# Patient Record
Sex: FEMALE | Race: BLACK OR AFRICAN AMERICAN
[De-identification: names, ages, dates, MRNs, and addresses within clinical notes are randomized per-mention and may not be internally consistent; named-entity substitution may affect disease eponyms.]

---

## 2020-08-14 ENCOUNTER — HOSPITAL ENCOUNTER (EMERGENCY)
Dept: HOSPITAL 15 - ER | Age: 25
Discharge: HOME | End: 2020-08-14
Payer: COMMERCIAL

## 2020-08-14 VITALS — WEIGHT: 200 LBS | HEIGHT: 69 IN | BODY MASS INDEX: 29.62 KG/M2

## 2020-08-14 VITALS — DIASTOLIC BLOOD PRESSURE: 61 MMHG | SYSTOLIC BLOOD PRESSURE: 107 MMHG

## 2020-08-14 DIAGNOSIS — N39.0: ICD-10-CM

## 2020-08-14 DIAGNOSIS — L51.9: Primary | ICD-10-CM

## 2020-08-14 LAB
ANION GAP SERPL CALCULATED.3IONS-SCNC: 6 MMOL/L (ref 5–15)
BUN SERPL-MCNC: 13 MG/DL (ref 7–18)
BUN/CREAT SERPL: 12.5
CALCIUM SERPL-MCNC: 8.5 MG/DL (ref 8.5–10.1)
CHLORIDE SERPL-SCNC: 103 MMOL/L (ref 98–107)
CO2 SERPL-SCNC: 27 MMOL/L (ref 21–32)
GLUCOSE SERPL-MCNC: 96 MG/DL (ref 74–106)
HCT VFR BLD AUTO: 40.5 % (ref 36–46)
HGB BLD-MCNC: 13.2 G/DL (ref 12.2–16.2)
MCH RBC QN AUTO: 27.9 PG (ref 28–32)
MCV RBC AUTO: 85.5 FL (ref 80–100)
NRBC BLD QL AUTO: 0.1 %
POTASSIUM SERPL-SCNC: 3.6 MMOL/L (ref 3.5–5.1)
SODIUM SERPL-SCNC: 136 MMOL/L (ref 136–145)

## 2020-08-14 PROCEDURE — 80048 BASIC METABOLIC PNL TOTAL CA: CPT

## 2020-08-14 PROCEDURE — 85025 COMPLETE CBC W/AUTO DIFF WBC: CPT

## 2020-08-14 PROCEDURE — 81001 URINALYSIS AUTO W/SCOPE: CPT

## 2020-08-14 PROCEDURE — 81025 URINE PREGNANCY TEST: CPT

## 2020-08-14 PROCEDURE — 36415 COLL VENOUS BLD VENIPUNCTURE: CPT

## 2021-04-29 ENCOUNTER — HOSPITAL ENCOUNTER (EMERGENCY)
Dept: HOSPITAL 15 - ER | Age: 26
LOS: 1 days | Discharge: HOME | End: 2021-04-30
Payer: SELF-PAY

## 2021-04-29 VITALS — HEIGHT: 69 IN | WEIGHT: 165 LBS | BODY MASS INDEX: 24.44 KG/M2

## 2021-04-29 DIAGNOSIS — T78.2XXA: Primary | ICD-10-CM

## 2021-04-29 PROCEDURE — 96374 THER/PROPH/DIAG INJ IV PUSH: CPT

## 2021-04-29 PROCEDURE — 99283 EMERGENCY DEPT VISIT LOW MDM: CPT

## 2021-04-29 PROCEDURE — 96361 HYDRATE IV INFUSION ADD-ON: CPT

## 2021-04-30 VITALS — SYSTOLIC BLOOD PRESSURE: 98 MMHG | DIASTOLIC BLOOD PRESSURE: 68 MMHG

## 2022-06-08 ENCOUNTER — HOSPITAL ENCOUNTER (OUTPATIENT)
Dept: HOSPITAL 15 - LAB | Age: 27
Discharge: HOME | End: 2022-06-08
Attending: OBSTETRICS & GYNECOLOGY
Payer: MEDICAID

## 2022-06-08 DIAGNOSIS — Z31.430: Primary | ICD-10-CM

## 2022-06-08 LAB
ALCOHOL, URINE: < 3 MG/DL (ref 0–10)
AMPHETAMINES UR QL SCN: NEGATIVE
BARBITURATES UR QL SCN: NEGATIVE
BENZODIAZ UR QL SCN: NEGATIVE
BZE UR QL SCN: NEGATIVE
CANNABINOIDS UR QL SCN: NEGATIVE
HCT VFR BLD AUTO: 39 % (ref 36–46)
HGB BLD-MCNC: 13.1 G/DL (ref 12.2–16.2)
MCH RBC QN AUTO: 29 PG (ref 28–32)
MCV RBC AUTO: 86.3 FL (ref 80–100)
NRBC BLD QL AUTO: 0.1 %
OPIATES UR QL SCN: NEGATIVE
PCP UR QL SCN: NEGATIVE

## 2022-06-08 PROCEDURE — 87340 HEPATITIS B SURFACE AG IA: CPT

## 2022-06-08 PROCEDURE — 84144 ASSAY OF PROGESTERONE: CPT

## 2022-06-08 PROCEDURE — 85660 RBC SICKLE CELL TEST: CPT

## 2022-06-08 PROCEDURE — 84112 EVAL AMNIOTIC FLUID PROTEIN: CPT

## 2022-06-08 PROCEDURE — 86900 BLOOD TYPING SEROLOGIC ABO: CPT

## 2022-06-08 PROCEDURE — 86703 HIV-1/HIV-2 1 RESULT ANTBDY: CPT

## 2022-06-08 PROCEDURE — 83021 HEMOGLOBIN CHROMOTOGRAPHY: CPT

## 2022-06-08 PROCEDURE — 86762 RUBELLA ANTIBODY: CPT

## 2022-06-08 PROCEDURE — 86901 BLOOD TYPING SEROLOGIC RH(D): CPT

## 2022-06-08 PROCEDURE — 87086 URINE CULTURE/COLONY COUNT: CPT

## 2022-06-08 PROCEDURE — 36415 COLL VENOUS BLD VENIPUNCTURE: CPT

## 2022-06-08 PROCEDURE — 85025 COMPLETE CBC W/AUTO DIFF WBC: CPT

## 2022-06-08 PROCEDURE — 86592 SYPHILIS TEST NON-TREP QUAL: CPT

## 2022-06-08 PROCEDURE — 84702 CHORIONIC GONADOTROPIN TEST: CPT

## 2022-06-08 PROCEDURE — 86850 RBC ANTIBODY SCREEN: CPT

## 2022-06-08 PROCEDURE — 80307 DRUG TEST PRSMV CHEM ANLYZR: CPT

## 2022-06-08 PROCEDURE — 83036 HEMOGLOBIN GLYCOSYLATED A1C: CPT

## 2022-07-03 ENCOUNTER — HOSPITAL ENCOUNTER (EMERGENCY)
Dept: HOSPITAL 15 - ER | Age: 27
Discharge: HOME | End: 2022-07-03
Payer: MEDICAID

## 2022-07-03 VITALS
HEIGHT: 69 IN | SYSTOLIC BLOOD PRESSURE: 118 MMHG | WEIGHT: 183 LBS | BODY MASS INDEX: 27.11 KG/M2 | DIASTOLIC BLOOD PRESSURE: 74 MMHG

## 2022-07-03 DIAGNOSIS — Z3A.20: ICD-10-CM

## 2022-07-03 DIAGNOSIS — O23.42: Primary | ICD-10-CM

## 2022-07-03 DIAGNOSIS — R51.9: ICD-10-CM

## 2022-07-03 DIAGNOSIS — N39.0: ICD-10-CM

## 2022-07-03 PROCEDURE — 81025 URINE PREGNANCY TEST: CPT

## 2022-07-03 PROCEDURE — 81001 URINALYSIS AUTO W/SCOPE: CPT

## 2022-07-14 ENCOUNTER — HOSPITAL ENCOUNTER (EMERGENCY)
Dept: HOSPITAL 15 - ER | Age: 27
Discharge: HOME | End: 2022-07-14
Payer: MEDICAID

## 2022-07-14 VITALS — DIASTOLIC BLOOD PRESSURE: 72 MMHG | SYSTOLIC BLOOD PRESSURE: 107 MMHG

## 2022-07-14 VITALS — WEIGHT: 185 LBS | HEIGHT: 69 IN | BODY MASS INDEX: 27.4 KG/M2

## 2022-07-14 DIAGNOSIS — N76.0: ICD-10-CM

## 2022-07-14 DIAGNOSIS — N39.0: Primary | ICD-10-CM

## 2022-07-14 PROCEDURE — 87210 SMEAR WET MOUNT SALINE/INK: CPT

## 2022-07-14 PROCEDURE — 81025 URINE PREGNANCY TEST: CPT

## 2022-07-14 PROCEDURE — 81001 URINALYSIS AUTO W/SCOPE: CPT

## 2022-09-27 ENCOUNTER — HOSPITAL ENCOUNTER (OUTPATIENT)
Dept: HOSPITAL 15 - LAB | Age: 27
Discharge: HOME | End: 2022-09-27
Attending: OBSTETRICS & GYNECOLOGY
Payer: MEDICAID

## 2022-09-27 DIAGNOSIS — Z3A.00: ICD-10-CM

## 2022-09-27 DIAGNOSIS — O99.810: Primary | ICD-10-CM

## 2022-09-27 LAB
HCT VFR BLD AUTO: 35.8 % (ref 36–46)
HGB BLD-MCNC: 11.8 G/DL (ref 12.2–16.2)
MCH RBC QN AUTO: 28.4 PG (ref 28–32)
MCV RBC AUTO: 86.1 FL (ref 80–100)
NRBC BLD QL AUTO: 0 %

## 2022-09-27 PROCEDURE — 85025 COMPLETE CBC W/AUTO DIFF WBC: CPT

## 2022-09-27 PROCEDURE — 36415 COLL VENOUS BLD VENIPUNCTURE: CPT

## 2022-09-27 PROCEDURE — 83036 HEMOGLOBIN GLYCOSYLATED A1C: CPT

## 2022-09-27 PROCEDURE — 82951 GLUCOSE TOLERANCE TEST (GTT): CPT

## 2022-10-25 ENCOUNTER — HOSPITAL ENCOUNTER (OUTPATIENT)
Dept: HOSPITAL 15 - LAB | Age: 27
Discharge: HOME | End: 2022-10-25
Attending: OBSTETRICS & GYNECOLOGY
Payer: MEDICAID

## 2022-10-25 DIAGNOSIS — Z20.2: Primary | ICD-10-CM

## 2022-10-25 PROCEDURE — 86592 SYPHILIS TEST NON-TREP QUAL: CPT

## 2022-10-25 PROCEDURE — 86703 HIV-1/HIV-2 1 RESULT ANTBDY: CPT

## 2022-10-25 PROCEDURE — 86696 HERPES SIMPLEX TYPE 2 TEST: CPT

## 2022-10-25 PROCEDURE — 86695 HERPES SIMPLEX TYPE 1 TEST: CPT

## 2022-10-25 PROCEDURE — 84112 EVAL AMNIOTIC FLUID PROTEIN: CPT

## 2022-10-28 ENCOUNTER — HOSPITAL ENCOUNTER (OUTPATIENT)
Dept: HOSPITAL 15 - LDRP | Age: 27
Setting detail: OBSERVATION
Discharge: HOME | End: 2022-10-28
Attending: OBSTETRICS & GYNECOLOGY | Admitting: OBSTETRICS & GYNECOLOGY
Payer: MEDICAID

## 2022-10-28 VITALS — BODY MASS INDEX: 27.4 KG/M2 | WEIGHT: 185 LBS | HEIGHT: 69 IN

## 2022-10-28 DIAGNOSIS — O60.03: Primary | ICD-10-CM

## 2022-10-28 DIAGNOSIS — J02.9: ICD-10-CM

## 2022-10-28 DIAGNOSIS — Z3A.31: ICD-10-CM

## 2022-10-28 DIAGNOSIS — O26.893: ICD-10-CM

## 2022-10-28 PROCEDURE — 36415 COLL VENOUS BLD VENIPUNCTURE: CPT

## 2022-10-28 PROCEDURE — 96360 HYDRATION IV INFUSION INIT: CPT

## 2022-10-28 PROCEDURE — 94760 N-INVAS EAR/PLS OXIMETRY 1: CPT

## 2022-10-28 PROCEDURE — 81002 URINALYSIS NONAUTO W/O SCOPE: CPT

## 2022-10-28 PROCEDURE — 96372 THER/PROPH/DIAG INJ SC/IM: CPT

## 2022-10-28 PROCEDURE — 59025 FETAL NON-STRESS TEST: CPT

## 2022-10-28 PROCEDURE — 87426 SARSCOV CORONAVIRUS AG IA: CPT

## 2022-12-06 ENCOUNTER — HOSPITAL ENCOUNTER (OUTPATIENT)
Dept: HOSPITAL 15 - LAB | Age: 27
Discharge: HOME | End: 2022-12-06
Attending: OBSTETRICS & GYNECOLOGY
Payer: MEDICAID

## 2022-12-06 DIAGNOSIS — Z11.3: Primary | ICD-10-CM

## 2022-12-06 LAB
HCT VFR BLD AUTO: 36.9 % (ref 36–46)
HGB BLD-MCNC: 12.1 G/DL (ref 12.2–16.2)
MCH RBC QN AUTO: 27.8 PG (ref 28–32)
MCV RBC AUTO: 84.6 FL (ref 80–100)
NRBC BLD QL AUTO: 0.1 %

## 2022-12-06 PROCEDURE — 36415 COLL VENOUS BLD VENIPUNCTURE: CPT

## 2022-12-06 PROCEDURE — 84112 EVAL AMNIOTIC FLUID PROTEIN: CPT

## 2022-12-06 PROCEDURE — 85025 COMPLETE CBC W/AUTO DIFF WBC: CPT

## 2022-12-06 PROCEDURE — 86592 SYPHILIS TEST NON-TREP QUAL: CPT

## 2022-12-13 ENCOUNTER — HOSPITAL ENCOUNTER (EMERGENCY)
Dept: HOSPITAL 15 - ER | Age: 27
Discharge: HOME | End: 2022-12-13
Payer: MEDICAID

## 2022-12-13 VITALS — WEIGHT: 229.28 LBS | HEIGHT: 68 IN | BODY MASS INDEX: 34.75 KG/M2

## 2022-12-13 VITALS — SYSTOLIC BLOOD PRESSURE: 123 MMHG | DIASTOLIC BLOOD PRESSURE: 81 MMHG

## 2022-12-13 DIAGNOSIS — Z79.899: ICD-10-CM

## 2022-12-13 DIAGNOSIS — O98.113: Primary | ICD-10-CM

## 2022-12-13 DIAGNOSIS — Z3A.00: ICD-10-CM

## 2022-12-13 DIAGNOSIS — A53.9: ICD-10-CM

## 2022-12-13 PROCEDURE — 99283 EMERGENCY DEPT VISIT LOW MDM: CPT

## 2022-12-13 PROCEDURE — 96372 THER/PROPH/DIAG INJ SC/IM: CPT

## 2022-12-25 ENCOUNTER — HOSPITAL ENCOUNTER (INPATIENT)
Dept: HOSPITAL 15 - LDRP | Age: 27
LOS: 2 days | Discharge: HOME | DRG: 560 | End: 2022-12-27
Attending: OBSTETRICS & GYNECOLOGY | Admitting: OBSTETRICS & GYNECOLOGY
Payer: MEDICAID

## 2022-12-25 VITALS — BODY MASS INDEX: 34.07 KG/M2 | HEIGHT: 69 IN | WEIGHT: 230 LBS

## 2022-12-25 DIAGNOSIS — Z20.822: ICD-10-CM

## 2022-12-25 LAB
ALBUMIN SERPL-MCNC: 2.8 G/DL (ref 3.4–5)
ALCOHOL, URINE: < 3 MG/DL (ref 0–10)
ALP SERPL-CCNC: 136 U/L (ref 45–117)
ALT SERPL-CCNC: 17 U/L (ref 13–56)
AMPHETAMINES UR QL SCN: NEGATIVE
ANION GAP SERPL CALCULATED.3IONS-SCNC: 9 MMOL/L (ref 5–15)
APTT PPP: 29.8 SEC (ref 24.6–33.4)
BARBITURATES UR QL SCN: NEGATIVE
BENZODIAZ UR QL SCN: NEGATIVE
BILIRUB SERPL-MCNC: 0.3 MG/DL (ref 0.2–1)
BUN SERPL-MCNC: 12 MG/DL (ref 7–18)
BUN/CREAT SERPL: 14
BZE UR QL SCN: NEGATIVE
CALCIUM SERPL-MCNC: 8.8 MG/DL (ref 8.5–10.1)
CANNABINOIDS UR QL SCN: NEGATIVE
CHLORIDE SERPL-SCNC: 107 MMOL/L (ref 98–107)
CO2 SERPL-SCNC: 22 MMOL/L (ref 21–32)
GLUCOSE SERPL-MCNC: 94 MG/DL (ref 74–106)
HCT VFR BLD AUTO: 36.2 % (ref 36–46)
HGB BLD-MCNC: 12.6 G/DL (ref 12.2–16.2)
INR PPP: 0.94 (ref 0.9–1.15)
MCH RBC QN AUTO: 29.2 PG (ref 28–32)
MCV RBC AUTO: 84 FL (ref 80–100)
NRBC BLD QL AUTO: 0 %
OPIATES UR QL SCN: NEGATIVE
PCP UR QL SCN: NEGATIVE
POTASSIUM SERPL-SCNC: 4 MMOL/L (ref 3.5–5.1)
PROT SERPL-MCNC: 5.8 G/DL (ref 6.4–8.2)
SODIUM SERPL-SCNC: 138 MMOL/L (ref 136–145)

## 2022-12-25 PROCEDURE — 85025 COMPLETE CBC W/AUTO DIFF WBC: CPT

## 2022-12-25 PROCEDURE — 94760 N-INVAS EAR/PLS OXIMETRY 1: CPT

## 2022-12-25 PROCEDURE — 82570 ASSAY OF URINE CREATININE: CPT

## 2022-12-25 PROCEDURE — 86592 SYPHILIS TEST NON-TREP QUAL: CPT

## 2022-12-25 PROCEDURE — 59025 FETAL NON-STRESS TEST: CPT

## 2022-12-25 PROCEDURE — 86901 BLOOD TYPING SEROLOGIC RH(D): CPT

## 2022-12-25 PROCEDURE — 96366 THER/PROPH/DIAG IV INF ADDON: CPT

## 2022-12-25 PROCEDURE — 62282 TREAT SPINAL CANAL LESION: CPT

## 2022-12-25 PROCEDURE — 87426 SARSCOV CORONAVIRUS AG IA: CPT

## 2022-12-25 PROCEDURE — 94762 N-INVAS EAR/PLS OXIMTRY CONT: CPT

## 2022-12-25 PROCEDURE — 80053 COMPREHEN METABOLIC PANEL: CPT

## 2022-12-25 PROCEDURE — 96361 HYDRATE IV INFUSION ADD-ON: CPT

## 2022-12-25 PROCEDURE — 84550 ASSAY OF BLOOD/URIC ACID: CPT

## 2022-12-25 PROCEDURE — 36415 COLL VENOUS BLD VENIPUNCTURE: CPT

## 2022-12-25 PROCEDURE — 81002 URINALYSIS NONAUTO W/O SCOPE: CPT

## 2022-12-25 PROCEDURE — 83735 ASSAY OF MAGNESIUM: CPT

## 2022-12-25 PROCEDURE — 96360 HYDRATION IV INFUSION INIT: CPT

## 2022-12-25 PROCEDURE — 80307 DRUG TEST PRSMV CHEM ANLYZR: CPT

## 2022-12-25 PROCEDURE — 96365 THER/PROPH/DIAG IV INF INIT: CPT

## 2022-12-25 PROCEDURE — 85610 PROTHROMBIN TIME: CPT

## 2022-12-25 PROCEDURE — 85730 THROMBOPLASTIN TIME PARTIAL: CPT

## 2022-12-25 PROCEDURE — 81001 URINALYSIS AUTO W/SCOPE: CPT

## 2022-12-25 PROCEDURE — 86900 BLOOD TYPING SEROLOGIC ABO: CPT

## 2022-12-25 PROCEDURE — 86850 RBC ANTIBODY SCREEN: CPT

## 2022-12-25 PROCEDURE — 59409 OBSTETRICAL CARE: CPT

## 2022-12-25 PROCEDURE — 84156 ASSAY OF PROTEIN URINE: CPT

## 2022-12-25 RX ADMIN — SODIUM CHLORIDE, SODIUM LACTATE, POTASSIUM CHLORIDE, AND CALCIUM CHLORIDE SCH MLS/HR: .6; .31; .03; .02 INJECTION, SOLUTION INTRAVENOUS at 22:34

## 2022-12-25 RX ADMIN — SODIUM CHLORIDE, SODIUM LACTATE, POTASSIUM CHLORIDE, AND CALCIUM CHLORIDE SCH MLS/HR: .6; .31; .03; .02 INJECTION, SOLUTION INTRAVENOUS at 22:35

## 2022-12-26 VITALS — SYSTOLIC BLOOD PRESSURE: 180 MMHG | DIASTOLIC BLOOD PRESSURE: 80 MMHG

## 2022-12-26 VITALS — DIASTOLIC BLOOD PRESSURE: 94 MMHG | SYSTOLIC BLOOD PRESSURE: 135 MMHG

## 2022-12-26 VITALS — SYSTOLIC BLOOD PRESSURE: 140 MMHG | DIASTOLIC BLOOD PRESSURE: 65 MMHG

## 2022-12-26 VITALS — DIASTOLIC BLOOD PRESSURE: 65 MMHG | SYSTOLIC BLOOD PRESSURE: 146 MMHG

## 2022-12-26 VITALS — SYSTOLIC BLOOD PRESSURE: 122 MMHG | DIASTOLIC BLOOD PRESSURE: 75 MMHG

## 2022-12-26 VITALS — DIASTOLIC BLOOD PRESSURE: 60 MMHG | SYSTOLIC BLOOD PRESSURE: 128 MMHG

## 2022-12-26 VITALS — DIASTOLIC BLOOD PRESSURE: 77 MMHG | SYSTOLIC BLOOD PRESSURE: 138 MMHG

## 2022-12-26 VITALS — DIASTOLIC BLOOD PRESSURE: 63 MMHG | SYSTOLIC BLOOD PRESSURE: 136 MMHG

## 2022-12-26 VITALS — DIASTOLIC BLOOD PRESSURE: 84 MMHG | SYSTOLIC BLOOD PRESSURE: 173 MMHG

## 2022-12-26 VITALS — SYSTOLIC BLOOD PRESSURE: 120 MMHG | DIASTOLIC BLOOD PRESSURE: 76 MMHG

## 2022-12-26 VITALS — DIASTOLIC BLOOD PRESSURE: 60 MMHG | SYSTOLIC BLOOD PRESSURE: 109 MMHG

## 2022-12-26 LAB
ALBUMIN SERPL-MCNC: 2.8 G/DL (ref 3.4–5)
ALP SERPL-CCNC: 148 U/L (ref 45–117)
ALT SERPL-CCNC: 16 U/L (ref 13–56)
ANION GAP SERPL CALCULATED.3IONS-SCNC: 11 MMOL/L (ref 5–15)
BILIRUB SERPL-MCNC: 0.4 MG/DL (ref 0.2–1)
BUN SERPL-MCNC: 10 MG/DL (ref 7–18)
BUN/CREAT SERPL: 10.4
CALCIUM SERPL-MCNC: 9.1 MG/DL (ref 8.5–10.1)
CHLORIDE SERPL-SCNC: 105 MMOL/L (ref 98–107)
CO2 SERPL-SCNC: 21 MMOL/L (ref 21–32)
GLUCOSE SERPL-MCNC: 83 MG/DL (ref 74–106)
MAGNESIUM SERPL-MCNC: 1.9 MG/DL (ref 1.6–2.6)
POTASSIUM SERPL-SCNC: 4 MMOL/L (ref 3.5–5.1)
PROT SERPL-MCNC: 6.9 G/DL (ref 6.4–8.2)
SODIUM SERPL-SCNC: 137 MMOL/L (ref 136–145)

## 2022-12-26 PROCEDURE — 00HU33Z INSERTION OF INFUSION DEVICE INTO SPINAL CANAL, PERCUTANEOUS APPROACH: ICD-10-PCS | Performed by: OBSTETRICS & GYNECOLOGY

## 2022-12-26 PROCEDURE — 3E0R3BZ INTRODUCTION OF ANESTHETIC AGENT INTO SPINAL CANAL, PERCUTANEOUS APPROACH: ICD-10-PCS | Performed by: OBSTETRICS & GYNECOLOGY

## 2022-12-26 RX ADMIN — IBUPROFEN PRN MG: 600 TABLET, FILM COATED ORAL at 17:48

## 2022-12-26 RX ADMIN — CEFAZOLIN SODIUM SCH MLS/HR: 1 INJECTION, SOLUTION INTRAVENOUS at 20:29

## 2022-12-26 RX ADMIN — IBUPROFEN PRN MG: 600 TABLET, FILM COATED ORAL at 03:10

## 2022-12-26 RX ADMIN — IBUPROFEN PRN MG: 600 TABLET, FILM COATED ORAL at 09:23

## 2022-12-26 RX ADMIN — CEFAZOLIN SODIUM SCH MLS/HR: 1 INJECTION, SOLUTION INTRAVENOUS at 12:30

## 2022-12-26 RX ADMIN — CEFAZOLIN SODIUM SCH MLS/HR: 1 INJECTION, SOLUTION INTRAVENOUS at 04:34

## 2022-12-27 VITALS — DIASTOLIC BLOOD PRESSURE: 78 MMHG | SYSTOLIC BLOOD PRESSURE: 127 MMHG

## 2022-12-27 VITALS — SYSTOLIC BLOOD PRESSURE: 118 MMHG | DIASTOLIC BLOOD PRESSURE: 72 MMHG

## 2022-12-27 VITALS — SYSTOLIC BLOOD PRESSURE: 124 MMHG | DIASTOLIC BLOOD PRESSURE: 77 MMHG

## 2022-12-27 LAB
PROT UR-MCNC: 9.6 MG/DL (ref 0–11.9)
URATE SERPL-MCNC: 6 MG/DL (ref 2.6–6)

## 2022-12-27 RX ADMIN — CEFAZOLIN SODIUM SCH MLS/HR: 1 INJECTION, SOLUTION INTRAVENOUS at 04:27

## 2022-12-27 RX ADMIN — IBUPROFEN PRN MG: 600 TABLET, FILM COATED ORAL at 04:59

## 2023-10-09 ENCOUNTER — HOSPITAL ENCOUNTER (EMERGENCY)
Dept: HOSPITAL 15 - ER | Age: 28
Discharge: HOME | End: 2023-10-09
Payer: MEDICAID

## 2023-10-09 VITALS
OXYGEN SATURATION: 98 % | HEART RATE: 86 BPM | SYSTOLIC BLOOD PRESSURE: 104 MMHG | RESPIRATION RATE: 18 BRPM | DIASTOLIC BLOOD PRESSURE: 64 MMHG | TEMPERATURE: 98 F

## 2023-10-09 VITALS — BODY MASS INDEX: 27.89 KG/M2 | HEIGHT: 69 IN | WEIGHT: 188.27 LBS
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
## 2023-10-09 DIAGNOSIS — S16.1XXA: Primary | ICD-10-CM
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
## 2023-10-09 DIAGNOSIS — S29.012A: ICD-10-CM

## 2023-10-09 PROCEDURE — 96372 THER/PROPH/DIAG INJ SC/IM: CPT

## 2023-10-09 PROCEDURE — 99284 EMERGENCY DEPT VISIT MOD MDM: CPT

## 2023-10-09 PROCEDURE — 72070 X-RAY EXAM THORAC SPINE 2VWS: CPT

## 2023-10-09 PROCEDURE — 72040 X-RAY EXAM NECK SPINE 2-3 VW: CPT

## 2024-11-20 ENCOUNTER — HOSPITAL ENCOUNTER (EMERGENCY)
Dept: HOSPITAL 15 - ER | Age: 29
Discharge: HOME | End: 2024-11-20
Payer: MEDICAID

## 2024-11-20 VITALS
SYSTOLIC BLOOD PRESSURE: 122 MMHG | TEMPERATURE: 98.8 F | HEART RATE: 74 BPM | OXYGEN SATURATION: 97 % | DIASTOLIC BLOOD PRESSURE: 72 MMHG | RESPIRATION RATE: 16 BRPM

## 2024-11-20 VITALS — BODY MASS INDEX: 29 KG/M2 | HEIGHT: 69 IN | WEIGHT: 195.77 LBS

## 2024-11-20 DIAGNOSIS — R10.33: ICD-10-CM

## 2024-11-20 DIAGNOSIS — N39.0: Primary | ICD-10-CM

## 2024-11-20 DIAGNOSIS — Z79.899: ICD-10-CM

## 2024-11-20 PROCEDURE — 76705 ECHO EXAM OF ABDOMEN: CPT

## 2024-12-24 ENCOUNTER — HOSPITAL ENCOUNTER (EMERGENCY)
Dept: HOSPITAL 15 - ER | Age: 29
Discharge: HOME | End: 2024-12-24
Payer: MEDICAID

## 2024-12-24 VITALS
SYSTOLIC BLOOD PRESSURE: 93 MMHG | DIASTOLIC BLOOD PRESSURE: 72 MMHG | TEMPERATURE: 98 F | OXYGEN SATURATION: 96 % | HEART RATE: 107 BPM | RESPIRATION RATE: 16 BRPM

## 2024-12-24 VITALS — WEIGHT: 186.51 LBS | BODY MASS INDEX: 27.62 KG/M2 | HEIGHT: 69 IN

## 2024-12-24 DIAGNOSIS — Z20.822: ICD-10-CM

## 2024-12-24 DIAGNOSIS — B34.9: Primary | ICD-10-CM

## 2024-12-24 LAB — SARS-COV+SARS-COV-2 AG RESP QL IA.RAPID: NEGATIVE

## 2024-12-24 PROCEDURE — 87804 INFLUENZA ASSAY W/OPTIC: CPT

## 2024-12-24 PROCEDURE — 36415 COLL VENOUS BLD VENIPUNCTURE: CPT

## 2024-12-24 PROCEDURE — 87426 SARSCOV CORONAVIRUS AG IA: CPT

## 2024-12-24 NOTE — ED.PDOC
History of Present Illness


HPI Comments


29 year old presents for a dry cough


Body aches and soreness


Taking no medication


Symptoms started 3 days ago


Denies fevers chills night sweats unintentional weight loss


Denies persistent chest pain, shortness of breath, leg swelling


Denies history of asthma nor any breathing conditions


Denies history of pneumonia


Denies recent international travel


Chief Complaint:  Flu like


Time Seen by MD:  10:09


Reviewed Notes:  Nurses Notes, Medications, Allergies


Information Source:  Patient





Past Medical History


PAST MEDICAL HISTORY:  Denies


Surgical History:  Denies all surgeries


GYN History:  Denies all GYN Hx





Family History


Family History:  Reviewed,noncontributory to illness





Social History


Smoker:  Non-Smoker


Alcohol:  Denies ETOH Use


Drugs:  Denies Drug Use


Lives In:  Home





All Other Systems:  Reviewed and Negative (Per HPI)





Physical Exam


General Appearance:  No Apparent Distress, Normal


HEENT:  Normal ENT Inspection, Pharynx Normal, TMs Normal


Neck:  Full Range of Motion, Non-Tender, Normal, Normal Inspection


Respiratory:  Chest Non-Tender, Lungs Clear, No Accessory Muscle Use, No 

Respiratory Distress, Normal Breath Sounds


Cardiovascular:  No Edema, No JVD, No Murmur, No Gallop, Normal Peripheral 

Pulses, Regular Rate/Rhythm


Breast Exam:  Deferred


Gastrointestinal:  No Organomegaly, Non Tender, No Pulsatile Mass, Normal Bowel 

Sounds, Soft


Genitalia:  Deferred


Pelvic:  Deferred


Rectal:  Deferred


Extremities:  No calf tenderness, Normal capillary refill, Normal inspection, 

Normal range of motion, Non-tender, No pedal edema


Musculoskeletal :  


   Apperance:  Normal


Neurologic:  Alert, CNs II-XII nml as Tested, No Motor Deficits, Normal Affect, 

Normal Mood, No Sensory Deficits


Cerebellar Function:  Normal


Reflexes:  Normal


Skin:  Dry, Normal Color, Warm


Lymphatic:  No Adenopathy





Was a procedure done?


Was a procedure done?:  No





Fever Differential Dx


Differential Diagnosis:  Influenza





X-Ray, Labs, Meds, VS





                                   Vital Signs








  Date Time  Temp Pulse Resp B/P (MAP) Pulse Ox O2 Delivery O2 Flow Rate FiO2


 


12/24/24 10:59 98.0 107 16 93/72 (79) 96   





 98.0       


 


12/24/24 10:59  107 16  96 Room Air  


 


12/24/24 09:49 98.0 107 16 93/72 (79) 96   








                                       Lab








Test


 12/24/24


10:19 Range/Units


 


 


Influenza Type A Antigen Negative  Negative  


 


Influenza Type B Antigen Negative  Negative  


 


SARS-CoV-2 Antigen (Rapid) Negative  NEGATIVE  








X-Ray, Labs, Meds, VS Comment


History and physical consistent of URI


Take medication as prescribed


No concerns for pneumonia at this time.  No risk factors.  No indication for 

antibiotics


Discussed that cough can linger up to 6 weeks after viral URI


ED precautions if cough does not alleviate or if cough worsens





Supportive care and return precautions discussed


Counseled viral infection and explained that antibiotics would not be helpful in

resolving the illness sooner.  Recommended vitamin C, rest, handwashing, and 

symptomatic care.  Expect 2-week course with possibly of cough lingering up to 6

weeks.  Nonpharmacological remedies for fluids has been recommended as well








Patient is stable for discharge at this time.


External notes reviewed.


Test results and diagnostic imaging interpreted.


All diagnostic findings, discharge care, education and instructions provided


Follow-up with PCP in 2 to 3 days


Patient verbalized understanding and agreed to treatment plan


Vital signs stable, afebrile, no acute distress noted


Patient ambulatory with strong steady gait


Advised to return precautions for any new or worsening symptoms, return to ER 

immediately for re-evaluation





Patient is aware that the purpose of this visit was for an acute medical 

emergency requiring emergent stabilization.  Chronic conditions, including 

malignancies have not been ruled out.  Patient is instructed to follow up with 

PCP as directed and discharge instructions for continued care and workup. If 

unable to arrange follow-up, patient is to return to the emergency department 

for reassessment.  Patient (parent or legal guardian if applicable) was given 

verbal and written discharge instructions and acknowledges understanding.


Time of 1ST Reevaluation:  14:45


Reevaluation 1ST:  Improved


Patient Education/Counseling:  Diagnosis, Treatment


Family Education/Counseling:  Diagnosis, Treatment





Departure 1


Departure


Time of Disposition:  14:53


Impression:  


   Primary Impression:  


   Viral syndrome


Disposition:  01 HOME / SELF CARE / HOMELESS


Condition:  Stable


e-Prescriptions


Ibuprofen Micronized (Ibuprofen) 800 Mg Tab


800 MG PO TID for 10 Days, #30 TAB 0 Refills


   Prov: DOUGIE GONZALEZ NP         12/24/24 


Promethazine-Dm (Promethazine Dm 6.25-15 mg/5Ml) 1 Sol Sol


5 ML PO TID for 10 Days, #150 ML 0 Refills


   Prov: DOUGIE GONZALEZ NP         12/24/24


Discharged With:  Self





Critical Care Note


Critical Care Time?:  No





Stability


Stability form required:  No





Heart Score


Heart Score:  








Heart Score Response (Comments) Value


 


History N/A 0


 


EKG N/A 0


 


Age N/A 0


 


Risk Factors N/A 0


 


Troponin N/A 0


 


Total  0

















DOUGIE GONZALEZ NP                Dec 24, 2024 11:28

## 2025-04-12 ENCOUNTER — HOSPITAL ENCOUNTER (EMERGENCY)
Dept: HOSPITAL 15 - ER | Age: 30
LOS: 1 days | Discharge: HOME | End: 2025-04-13
Payer: MEDICAID

## 2025-04-12 VITALS — BODY MASS INDEX: 24.26 KG/M2 | WEIGHT: 160.06 LBS | HEIGHT: 68 IN

## 2025-04-12 DIAGNOSIS — T78.49XA: Primary | ICD-10-CM
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
## 2025-04-13 VITALS
RESPIRATION RATE: 17 BRPM | OXYGEN SATURATION: 99 % | TEMPERATURE: 97.8 F | SYSTOLIC BLOOD PRESSURE: 96 MMHG | DIASTOLIC BLOOD PRESSURE: 63 MMHG | HEART RATE: 63 BPM

## 2025-04-13 RX ADMIN — SODIUM CHLORIDE ONE MG: 9 INJECTION, SOLUTION INTRAVENOUS at 00:55

## 2025-04-13 RX ADMIN — METHYLPREDNISOLONE SODIUM SUCCINATE ONE MG: 125 INJECTION, POWDER, FOR SOLUTION INTRAMUSCULAR; INTRAVENOUS at 00:55
